# Patient Record
Sex: MALE | URBAN - METROPOLITAN AREA
[De-identification: names, ages, dates, MRNs, and addresses within clinical notes are randomized per-mention and may not be internally consistent; named-entity substitution may affect disease eponyms.]

---

## 2024-10-21 ENCOUNTER — TREATMENT (OUTPATIENT)
Dept: PHYSICAL THERAPY | Facility: CLINIC | Age: 24
End: 2024-10-21
Payer: COMMERCIAL

## 2024-10-21 DIAGNOSIS — M54.50 LOW BACK PAIN, UNSPECIFIED BACK PAIN LATERALITY, UNSPECIFIED CHRONICITY, UNSPECIFIED WHETHER SCIATICA PRESENT: Primary | ICD-10-CM

## 2024-10-21 DIAGNOSIS — M53.86 DECREASED RANGE OF MOTION OF INTERVERTEBRAL DISCS OF LUMBAR SPINE: ICD-10-CM

## 2024-10-21 NOTE — PROGRESS NOTES
"  Physical Therapy Initial Evaluation and Plan of Care                                               [address] [facility], [address]                                                           [place], [place]                                                         [phone]    Patient: Jze Natarajan   : 2000  Diagnosis/ICD-10 Code:  Low back pain, unspecified back pain laterality, unspecified chronicity, unspecified whether sciatica present [M54.50]  Referring practitioner: No ref. provider found  Date of Initial Visit: 10/21/2024  Today's Date: 10/21/2024  Patient seen for 1 sessions           Subjective Questionnaire: Oswestry: 14      Subjective Evaluation    History of Present Illness  Mechanism of injury: Pt reports onset of low back pain and L LE numbness in July of this year after lifting a bag of concrete while working on a project. He states that he bent down and picked up the heavy bag then felt and heard a loud pop in his back. He felt immediate pain followed by numbness down the posterior aspect of his L LE to his toes. Pt states that he went to the ED several times following the injury.    He initially had great difficulty with sitting and sleeping. The pain was constant and prevented him from participating in recreational activities such a pickleball. The numbness in his L LE was also constant.     Pt is being followed by pain medicine and a \"spine specialist\" with [facility]. He received an injection (he was unsure which type) 3-4 weeks ago which helped to significantly decrease his pain, discomfort, and numbness. X-ray imaging shows \"lumbar spondylosis, chronic L1 transverse process fracture, and left lumbar radiculopathy\" MRI taken in August shows \"At L4-5 there is a left-sided broad-based disc bulge with left lateral recess stenosis. At L5-S1 there is a right paracentral disc protrusion with right lateral recess stenosis, and annular tear.\"    Pt states that he is now able to sit as long as " needed though does take standing breaks with his standing desk throughout the work day. He has been playing pickleball and stretching daily. He continues to pain pain in his L buttocks but it no longer radiates down his L LE. He has persisting numbness down his posterior L LE to his toes on occasion which mostly comes on when trying to perform forward flexion. He reports relief of symptoms with standing and walking. His main complaint is sleep disturbance due to discomfort in most positions.       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       Postural Observations  Seated posture: good  Standing posture: good      Palpation     Additional Palpation Details  No TTP  No TTP    Neurological Testing     Sensation     Lumbar   Left   Intact: light touch    Right   Intact: light touch    Active Range of Motion     Right Hip   Normal active range of motion    Additional Active Range of Motion Details  Lumbar AROM Percentage:  Flexion: 25% with discomfort  Extension: 100%  Rotation (R): 100%  Rotation (L): 100%  Lateral flexion (R): 100%  Lateral flexion (L): 100%  L hip flexion significantly limited by neural tension    Strength/Myotome Testing     Left Hip   Planes of Motion   Flexion: 3  Abduction: 4    Right Hip   Planes of Motion   Flexion: 4+  Abduction: 4+    Lumbar Flexibility Comments:   Significant impairment in left hamstring due to nerve tightness    Left  "Hip Flexibility Comments:   HS is limited due to neural tension     Right Hip Flexibility Comments:   WNL          Assessment & Plan       Assessment  Impairments: abnormal or restricted ROM, activity intolerance, impaired physical strength, lacks appropriate home exercise program, pain with function and safety issue   Functional limitations: lifting, sleeping, moving in bed and sitting   Assessment details: Jez is a 24 y/o male reporting to OP PT for initial evaluation regarding low back and L LE pain which began following an injury which occurred in July of this year. He was lifting a heavy bag of concrete from the ground when he felt and heard a loud pop in his lower back which was followed by severe pain and numbness down the posterior aspect of his L LE to his toes. Initially Jez was not able to sit or lie down for prolonged periods of time due to his pain and had to modify his activity level including avoiding playing pickleball. X-ray imaging showed \"lumbar spondylosis, chronic L1 transverse process fracture, and left lumbar radiculopathy\" and a MRI taken in August shows \"At L4-5 there is a left-sided broad-based disc bulge with left lateral recess stenosis. At L5-S1 there is a right paracentral disc protrusion with right lateral recess stenosis, and annular tear.\" Jez has since had an epidural injection which did help to decrease his symptoms. He is now able to sit as long as needed and has returned to playing pickleball. His pain is localized to his L buttock and he has occasional onset of numbness down his posterior L LE to his toes. Jez's main complaint is sleep disturbance. Upon evaluation, he presents with significant deficits in lumbar flexion AROM and his L LE strength and flexibility is significantly limited by neural tension. Skilled OP PT is deemed reasonable and necessary in order to address these deficits, limitations, and impairments and assist with return to prior level of function. "   Prognosis: good    Goals  Plan Goals: Short Term: 4 weeks  1. Pt will be independent and compliant with initial HEP  2. Pt will report 50% improvement in L LE symptoms  3. Pt will demonstrate lumbar flexion AROM to >/= 50% for improved mobility and ability to perform ADLs  4. Pt will perform 90-90 sciatic nerve gliding for indication of decreased neural tension    Long Term: 8 weeks  1. Pt will be independent with progressed HEP  2. Pt will report 0-2/10 pain in his low back and L LE  3. Pt will report >/= 75% decrease in L LE numbness  4. Pt will demonstrate L hip flexion and L hamstring flexibility WNL for indication of decreased neural tension and symmetrical ability to perform all ADLs and recreational activities  5. Pt will report improved sleep through the night to 6-8 hours without disturbance from L LE pain    Plan  Therapy options: will be seen for skilled therapy services  Planned modality interventions: cryotherapy, electrical stimulation/Russian stimulation and thermotherapy (hydrocollator packs)  Planned therapy interventions: flexibility, functional ROM exercises, home exercise program, therapeutic activities, stretching, strengthening, neuromuscular re-education, postural training and body mechanics training  Frequency: 2x week  Duration in weeks: 8  Treatment plan discussed with: patient        Manual Therapy:    0     mins  06251;  Therapeutic Exercise:    10     mins  91792;     Neuromuscular Lawanda:    0    mins  29774;    Therapeutic Activity:     10     mins  62152;     Gait Trainin     mins  82908;     Ultrasound:     0     mins  00806;    Electrical Stimulation:    0     mins  01088 ( );  Dry Needling     0     mins self-pay    Timed Treatment:   20   mins   Total Treatment:     48   mins    PT SIGNATURE: Michelet Alex PT, DPT  [place] License #: [id number]   Michelet Alex PT, 10/21/24, 3:41 PM EDT  DATE TREATMENT INITIATED: 10/21/2024    Initial Certification  Certification  Period: 1/19/2025  I certify that the therapy services are furnished while this patient is under my care.  The services outlined above are required by this patient, and will be reviewed every 90 days.     PHYSICIAN:       DATE:     Please sign and return via fax to [phone].. Thank you, [facility].

## 2024-10-23 ENCOUNTER — TRANSCRIBE ORDERS (OUTPATIENT)
Dept: PHYSICAL THERAPY | Facility: CLINIC | Age: 24
End: 2024-10-23
Payer: COMMERCIAL

## 2024-10-23 DIAGNOSIS — M51.26 PROTRUDED LUMBAR DISC: ICD-10-CM

## 2024-10-23 DIAGNOSIS — M54.16 LUMBAR RADICULOPATHY: Primary | ICD-10-CM

## 2024-10-28 ENCOUNTER — TREATMENT (OUTPATIENT)
Dept: PHYSICAL THERAPY | Facility: CLINIC | Age: 24
End: 2024-10-28
Payer: COMMERCIAL

## 2024-10-28 DIAGNOSIS — M54.16 LUMBAR RADICULOPATHY: Primary | ICD-10-CM

## 2024-10-28 DIAGNOSIS — M51.26 PROTRUDED LUMBAR DISC: ICD-10-CM

## 2024-11-01 ENCOUNTER — TREATMENT (OUTPATIENT)
Dept: PHYSICAL THERAPY | Facility: CLINIC | Age: 24
End: 2024-11-01
Payer: COMMERCIAL

## 2024-11-01 DIAGNOSIS — M54.16 LUMBAR RADICULOPATHY: Primary | ICD-10-CM

## 2024-11-01 DIAGNOSIS — M53.86 DECREASED RANGE OF MOTION OF INTERVERTEBRAL DISCS OF LUMBAR SPINE: ICD-10-CM

## 2024-11-01 DIAGNOSIS — M51.26 PROTRUDED LUMBAR DISC: ICD-10-CM

## 2024-11-01 DIAGNOSIS — M54.50 LOW BACK PAIN, UNSPECIFIED BACK PAIN LATERALITY, UNSPECIFIED CHRONICITY, UNSPECIFIED WHETHER SCIATICA PRESENT: ICD-10-CM

## 2024-11-01 NOTE — PROGRESS NOTES
Physical Therapy Daily Progress Note                                            [address] [facility], [address]                                                           [place], [place]                                                         [phone]    Patient: Jez Natarajan   : 2000  Diagnosis/ICD-10 Code:  Lumbar radiculopathy [M54.16]  Referring practitioner: ZULMA Wesley  Date of Initial Visit: Type: THERAPY  Noted: 10/21/2024  Today's Date: 2024  Patient seen for 2 sessions           Subjective Evaluation    History of Present Illness    Subjective comment: Jez reports mostly no change in his low back and L LE       Objective   See Exercise, Manual, and Modality Logs for complete treatment.       Assessment & Plan       Assessment  Assessment details: Jez demonstrated great form during all previously prescribed exercises. He admitted that he had not performed his HEP as much as he should because it was taking him 1 hour to run through 5 exercises. After further inquiry, he stated that he had been holding most stretches for 30 seconds- 1 minute and performing 10 of each. Time spent going through all of his exercises to ensure proper holds and repetitions. Jez was given a new print-out of his HEP as well to make sure that his HEP was more manageable for the next week. Today's session focused on initiating a progressive transverse abdominus strengthening program as well as multifidus strengthening. Jez tolerated all exercises and benefited from verbal, visual, and tactile cueing for form. He had slight difficulty maintaining a neutral pelvis during the bird dog exercise, often moving into pelvic rotation, indicating decreased oblique and transverse abdominal strength. Plan to progress his strengthening program as tolerated to assist with improving spinal stabilization.         Progress per Plan of Care           Manual Therapy:    0     mins  12157;  Therapeutic Exercise:     27     mins  66569;     Neuromuscular Lawanda:    10    mins  12302;    Therapeutic Activity:     0     mins  39543;     Gait Trainin     mins  85504;     Ultrasound:     0     mins  15443;    Electrical Stimulation:    0     mins  82622 ( );  Dry Needling     0     mins self-pay    Timed Treatment:   37   mins   Total Treatment:     37   mins    Michelet Alex PT, DPT  Physical Therapist  [place] License #: [id number]  Michelet Alex PT, 24, 2:34 PM EDT

## 2024-12-06 ENCOUNTER — TREATMENT (OUTPATIENT)
Dept: PHYSICAL THERAPY | Facility: CLINIC | Age: 24
End: 2024-12-06
Payer: COMMERCIAL

## 2024-12-06 DIAGNOSIS — M54.50 ACUTE LOW BACK PAIN, UNSPECIFIED BACK PAIN LATERALITY, UNSPECIFIED WHETHER SCIATICA PRESENT: Primary | ICD-10-CM

## 2024-12-06 PROCEDURE — 97110 THERAPEUTIC EXERCISES: CPT

## 2024-12-06 PROCEDURE — 97530 THERAPEUTIC ACTIVITIES: CPT

## 2024-12-06 NOTE — PROGRESS NOTES
Re-Assessment   [address] [facility], [address]  [place], [place]  [phone]      Patient: Jez Natarajan   : 2000  Diagnosis/ICD-10 Code:  Acute low back pain, unspecified back pain laterality, unspecified whether sciatica present [M54.50]  Referring practitioner: ZULMA Wesley  Date of Initial Visit: Type: THERAPY  Noted: 10/21/2024  Today's Date: 2024  Patient seen for 3 sessions      Subjective:     Subjective Questionnaire: Oswestry: 14 (previous 14)  Clinical Progress: unchanged  Home Program Compliance: Yes  Treatment has included: therapeutic exercise and neuromuscular re-education    Subjective Evaluation    History of Present Illness  Mechanism of injury: Pt reports generally no change in his low back pain. [place]. He states that he performs his HEP prior to playing pickleball but does not perform it any other time.  He continues to have significant pain in the morning after sleeping all night. The pain usually gets better throughout his day. He continues to rely heavily on pain medication.    Only improvement noted is improved ability to sit throughout the day to complete his work. He is able to make it through his work day now.            Pain  Current pain ratin  At worst pain ratin    Objective     Active Range of Motion      Right Hip   Normal active range of motion     Additional Active Range of Motion Details  Lumbar AROM Percentage:  Flexion: 25% with pain  Extension: 100%  Rotation (R): 100%  Rotation (L): 100%  Lateral flexion (R): 100%  Lateral flexion (L): 100%  L hip flexion significantly limited by neural tension     Strength/Myotome Testing      Left Hip   Planes of Motion   Flexion: 4+  Abduction: 4+     Right Hip   Planes of Motion   Flexion: 4+  Abduction: 4+    Assessment & Plan       Assessment  Assessment details: Re-assessment attempted today, however pt was not able or willing to demonstrate active range  of motion of his thoracolumbar spine and did not wish to perform any HEP activities to assess form. He had significant pain when arriving to his treatment session, and had pain when attempting even initial, gentle core strengthening. He has made little improvement in subjective reports of low back and L LE pain, but reports some HEP compliance. Re-assessment will be continued at his next session which is scheduled in the afternoon in hopes that he will be more willing to demonstrate range of motion and participate in exercise.         Goals  Plan Goals: Short Term: 4 weeks  1. Pt will be independent and compliant with initial HEP- MET  2. Pt will report 50% improvement in L LE symptoms- Not met  3. Pt will demonstrate lumbar flexion AROM to >/= 50% for improved mobility and ability to perform ADLs- Not met  4. Pt will perform 90-90 sciatic nerve gliding for indication of decreased neural tension- MET     Long Term: 8 weeks  1. Pt will be independent with progressed HEP- MET  2. Pt will report 0-2/10 pain in his low back and L LE- Not met  3. Pt will report >/= 75% decrease in L LE numbness- Not met  4. Pt will demonstrate L hip flexion and L hamstring flexibility WNL for indication of decreased neural tension and symmetrical ability to perform all ADLs and recreational activities- Not met  5. Pt will report improved sleep through the night to 6-8 hours without disturbance from L LE pain- MET    Progress toward previous goals: Partially Met        Recommendations: Continue with recommendations Improved attendance, increased HEP performance  Timeframe: 1 month  Prognosis to achieve goals: good    PT Signature: Michelet Alex PT, DPT  [place] License #: [id number]  Michelet Alex PT, 24, 9:34 AM EST      Manual Therapy:    0     mins  11783;  Therapeutic Exercise:    9     mins  43901;     Neuromuscular Lawanda:    0    mins  10009;    Therapeutic Activity:     20     mins  16121;     Gait Trainin     mins   95913;     Ultrasound:     0     mins  09845;    Electrical Stimulation:    0     mins  86660 ( );  Dry Needling     0     mins self-pay    Timed Treatment:   29   mins   Total Treatment:     29   mins

## 2024-12-13 ENCOUNTER — TREATMENT (OUTPATIENT)
Dept: PHYSICAL THERAPY | Facility: CLINIC | Age: 24
End: 2024-12-13
Payer: COMMERCIAL

## 2024-12-13 DIAGNOSIS — M54.50 ACUTE LOW BACK PAIN, UNSPECIFIED BACK PAIN LATERALITY, UNSPECIFIED WHETHER SCIATICA PRESENT: Primary | ICD-10-CM

## 2024-12-13 PROCEDURE — 97110 THERAPEUTIC EXERCISES: CPT

## 2024-12-13 PROCEDURE — 97112 NEUROMUSCULAR REEDUCATION: CPT

## 2024-12-13 NOTE — PROGRESS NOTES
Physical Therapy Daily Progress Note/ Re-Assessment                                            [address] [facility], [address]                                                           [place], [place]                                                         [phone]    Patient: Jez Natarajan   : 2000  Diagnosis/ICD-10 Code:  Acute low back pain, unspecified back pain laterality, unspecified whether sciatica present [M54.50]  Referring practitioner: ZULMA Wesley  Date of Initial Visit: Type: THERAPY  Noted: 10/21/2024  Today's Date: 2024  Patient seen for 4 sessions           Subjective Evaluation    History of Present Illness    Subjective comment: Jez reports significant improvement in his low  back and L LE pain over the past week. He has been playing pickleball and performing his exercises but is stopping once his L buttock becomes irritated. He still has the most pain in the morning after waking up but he is able to sleep 6-7 hours without waking up       Objective     See Exercise, Manual, and Modality Logs for complete treatment.       Assessment & Plan       Assessment  Assessment details: Jez was able to perform most HEP activities and demonstrates great form, indicating good compliance. He has been able to perform all strengthening exercises except for bird dogs. When attempting this exercise today, he was not able to maintain a neutral pelvis when stabilizing through his L LE, demonstrating pelvic forward and backward rotation on the right. This indicates persisting deficits in multifidus strength. He was educated on how to modify bird dogs with only one extremity moving at once. Jez was educated on and performed anti-rotation core and trunk strengthening this date to assist with improving spinal stabilization while playing pickleball. We will decrease frequency to bi-weekly at this time to allow for re-assessment and to progress or regress his HEP as needed.          Progress per Plan of Care           Manual Therapy:    0     mins  71518;  Therapeutic Exercise:    15     mins  73138;     Neuromuscular Lawanda:   23     mins  59432;    Therapeutic Activity:     0     mins  20661;     Gait Trainin     mins  35011;     Ultrasound:     0     mins  84790;    Electrical Stimulation:    0     mins  61007 ( );  Dry Needling     0     mins self-pay    Timed Treatment:   38   mins   Total Treatment:     38   mins    Michelet Alex PT, DPT  Physical Therapist  [place] License #: [id number]  Michelet Alex PT, 24, 2:32 PM EST

## 2025-02-03 ENCOUNTER — DOCUMENTATION (OUTPATIENT)
Dept: PHYSICAL THERAPY | Facility: CLINIC | Age: 25
End: 2025-02-03
Payer: COMMERCIAL